# Patient Record
Sex: FEMALE | Race: WHITE | NOT HISPANIC OR LATINO | URBAN - NONMETROPOLITAN AREA
[De-identification: names, ages, dates, MRNs, and addresses within clinical notes are randomized per-mention and may not be internally consistent; named-entity substitution may affect disease eponyms.]

---

## 2017-10-04 PROBLEM — K31.89 OTHER DISEASES OF STOMACH AND DUODENUM: Status: ACTIVE | Noted: 2017-10-04

## 2020-11-24 ENCOUNTER — OFFICE (OUTPATIENT)
Dept: URBAN - NONMETROPOLITAN AREA CLINIC 15 | Facility: CLINIC | Age: 80
End: 2020-11-24
Payer: COMMERCIAL

## 2020-11-24 VITALS
RESPIRATION RATE: 14 BRPM | HEART RATE: 69 BPM | TEMPERATURE: 97.4 F | DIASTOLIC BLOOD PRESSURE: 79 MMHG | HEIGHT: 59 IN | WEIGHT: 146 LBS | SYSTOLIC BLOOD PRESSURE: 178 MMHG

## 2020-11-24 DIAGNOSIS — K59.09 OTHER CONSTIPATION: ICD-10-CM

## 2020-11-24 DIAGNOSIS — K29.70 GASTRITIS, UNSPECIFIED, WITHOUT BLEEDING: ICD-10-CM

## 2020-11-24 DIAGNOSIS — R13.10 DYSPHAGIA, UNSPECIFIED: ICD-10-CM

## 2020-11-24 DIAGNOSIS — K21.9 GASTRO-ESOPHAGEAL REFLUX DISEASE WITHOUT ESOPHAGITIS: ICD-10-CM

## 2020-11-24 DIAGNOSIS — R14.3 FLATULENCE: ICD-10-CM

## 2020-11-24 DIAGNOSIS — Z79.899 OTHER LONG TERM (CURRENT) DRUG THERAPY: ICD-10-CM

## 2020-11-24 PROCEDURE — 99214 OFFICE O/P EST MOD 30 MIN: CPT

## 2020-11-24 RX ORDER — LACTULOSE 10 G/15ML
200 SOLUTION ORAL
Qty: 720 | Refills: 11 | Status: ACTIVE

## 2022-10-26 ENCOUNTER — OFFICE (OUTPATIENT)
Dept: URBAN - NONMETROPOLITAN AREA CLINIC 5 | Facility: CLINIC | Age: 82
End: 2022-10-26
Payer: COMMERCIAL

## 2022-10-26 VITALS
HEIGHT: 59 IN | RESPIRATION RATE: 17 BRPM | HEART RATE: 65 BPM | SYSTOLIC BLOOD PRESSURE: 171 MMHG | SYSTOLIC BLOOD PRESSURE: 176 MMHG | DIASTOLIC BLOOD PRESSURE: 66 MMHG | WEIGHT: 145 LBS | DIASTOLIC BLOOD PRESSURE: 77 MMHG

## 2022-10-26 DIAGNOSIS — K21.9 GASTRO-ESOPHAGEAL REFLUX DISEASE WITHOUT ESOPHAGITIS: ICD-10-CM

## 2022-10-26 DIAGNOSIS — K22.4 DYSKINESIA OF ESOPHAGUS: ICD-10-CM

## 2022-10-26 DIAGNOSIS — R49.0 DYSPHONIA: ICD-10-CM

## 2022-10-26 DIAGNOSIS — K59.01 SLOW TRANSIT CONSTIPATION: ICD-10-CM

## 2022-10-26 PROCEDURE — 99214 OFFICE O/P EST MOD 30 MIN: CPT | Performed by: INTERNAL MEDICINE

## 2022-10-26 NOTE — SERVICENOTES
Will add back an H2 blocker at night and given that she has had Pepcid periodically I will change over to Tagamet 400 mg, since that should work better being new to her system and all.  Continue Protonix in the morning and before she eats so as to get the maximal efficacy from it

Reviewed various foods that should go down okay and those that she is to avoid or minimize due to the motility issues at hand.  She is generally whethering all of this about as well as can be expected

She may take an extra half capful of MiraLax on days that she has not had an adequate bowel movement, and a full extra capful on days that she has not gone at all.  That should help keep her from getting too far behind

RTC in 6-8 months in the [facility] office for recheck.  Call for any problems in the interim

## 2022-10-26 NOTE — SERVICEHPINOTES
Felisa Machado   is a   82   year old  female   who is here today for routine follow up.  I have seen her off and on for number of years. This is the 1st time she has been here since 2020. She has tried to avoid most appointments and other events out in public due to the COVID pandemic and the high risk profile patient that she is. Unfortunately she is almost wheelchair bound as a part of advanced severe scoliosis.She has acid reflux disease complicated fairly significant, ineffective esophageal motility disorder. She accommodates for issues due to the latter by a boy Ng rice, corn bread, and other breads and eats her food in smaller bites and takes short breaks during a meal to allow everything to pass on through. If she eats a bit more quickly things will start to fill and come up in the throat briefly. Generally acid reflux was doing well up until a few months ago when she began having increased nocturnal issues. She has had that often on over the last number of years and typically responds well to the addition of an H2 blocker at night. Right now she is on Protonix pre morning and is tolerating that well. She gets queasy sometimes but has no vomiting. Her throat has been sore in the mornings and it has made her voice weaker than usual.She also has slow transit constipation. She mostly stays regulated by taking daily MiraLax. She has been on daily narcotics because of her spine and back issues for few years but occasionally takes an extra tablet in the afternoon and that usually sets her bowels off to be more constipated for a day or two. Has had no bleeding. Last colonoscopy was eight years ago.